# Patient Record
Sex: MALE | Race: WHITE | Employment: FULL TIME | URBAN - METROPOLITAN AREA
[De-identification: names, ages, dates, MRNs, and addresses within clinical notes are randomized per-mention and may not be internally consistent; named-entity substitution may affect disease eponyms.]

---

## 2018-03-21 ENCOUNTER — APPOINTMENT (OUTPATIENT)
Dept: OCCUPATIONAL MEDICINE | Age: 68
End: 2018-03-21
Attending: FAMILY MEDICINE

## 2018-03-27 ENCOUNTER — APPOINTMENT (OUTPATIENT)
Dept: OCCUPATIONAL MEDICINE | Age: 68
End: 2018-03-27
Attending: FAMILY MEDICINE

## 2018-04-03 ENCOUNTER — APPOINTMENT (OUTPATIENT)
Dept: OCCUPATIONAL MEDICINE | Age: 68
End: 2018-04-03
Attending: FAMILY MEDICINE

## 2022-01-31 ENCOUNTER — APPOINTMENT (OUTPATIENT)
Dept: FAMILY MEDICINE | Facility: CLINIC | Age: 72
End: 2022-01-31
Payer: COMMERCIAL

## 2022-01-31 DIAGNOSIS — Z20.822 CONTACT WITH AND (SUSPECTED) EXPOSURE TO COVID-19: ICD-10-CM

## 2022-01-31 LAB — SARS-COV-2 RNA RESP QL NAA+PROBE: NEGATIVE

## 2022-01-31 PROCEDURE — U0005 INFEC AGEN DETEC AMPLI PROBE: HCPCS | Performed by: FAMILY MEDICINE

## 2022-01-31 PROCEDURE — 87635 SARS-COV-2 COVID-19 AMP PRB: CPT | Performed by: FAMILY MEDICINE

## 2022-01-31 PROCEDURE — 99211 OFF/OP EST MAY X REQ PHY/QHP: CPT | Mod: CS,LAB | Performed by: FAMILY MEDICINE

## 2022-02-02 ENCOUNTER — TELEPHONE (OUTPATIENT)
Dept: FAMILY MEDICINE | Facility: CLINIC | Age: 72
End: 2022-02-02
Payer: COMMERCIAL

## 2022-02-02 NOTE — TELEPHONE ENCOUNTER
Your COVID-19 test has returned NEGATIVE.     Per the CDC guidelines, if you have had direct exposure (within 6 feet of someone for a cumulative total of 15 minutes or more over a 24-hour period) with a COVID positive patient in the last 14 days, it is recommended to Quarantine as follows:  If you/exposed person(s):  • Have been boosted OR  • Completed the primary series of Pfizer or Moderna vaccine within the last 6 months OR  • Completed the primary series of J&J vaccine within the last 2 months  Then:   • Wear a mask around others for 10 days.  • Test on day 5, if possible.  • If you develop symptoms get a test and stay home.    If you/exposed person(s):  • Completed the primary series of Pfizer or Moderna vaccine over 6 months ago and are not boosted OR  • Completed the primary series of J&J over 2 months ago and are not boosted OR  • Are unvaccinated  Then:   • Stay home for 5 days. After that continue to wear a mask around others for 5 additional days.  • If you can't quarantine you must wear a mask for 10 days.  • Test on day 5 if possible.  • If you develop symptoms get a test and stay home  Any close contacts in quarantine that becomes symptomatic can schedule COVID19 testing right away via self-scheduling options our website at [employer], on your [employer] patient portal or call [phone].  For further guidance on isolation, quarantine, or questions on COVID-19, please visit the [facility] website a Your Health  COVID-19  CDC    Continue to follow CDC guidelines to reduce risk of transmission and exposure to COVID-19 which include:  -Good social distancing practices  -Wear a cloth facial covering when in public  -Frequent hand washing with soap and water or an alcohol based hand  that contains at least 60% alcohol  -Regularly clean high touch surfaces  -Stay home if you are not feeling well    You may be a candidate for a product, REGEN-COV, that has been approved by the FDA for Emergency Use  Authorization for post-exposure prophylaxis (prevention) of COVID-19. This medication is for patients age 12 and older who are at high risk for progression to severe COVID-19 disease who are not vaccinated. For further information go to:   [email or web address]/[email or web address]-covid19-eua-fact-sheet-for-hcp.[email or web address]     If you are interested in receiving this treatment, please contact your Primary Care Provider or call [employer] Triage at [phone].    If you are interested in receiving a COVID-19 vaccination, please go to [employer], log onto your [employer] account or call [phone] to schedule.

## 2023-12-20 ENCOUNTER — OFFICE VISIT (OUTPATIENT)
Dept: URGENT CARE | Facility: CLINIC | Age: 73
End: 2023-12-20
Payer: MEDICARE

## 2023-12-20 VITALS
SYSTOLIC BLOOD PRESSURE: 152 MMHG | DIASTOLIC BLOOD PRESSURE: 84 MMHG | HEART RATE: 70 BPM | WEIGHT: 264 LBS | OXYGEN SATURATION: 95 % | TEMPERATURE: 97.5 F | RESPIRATION RATE: 18 BRPM

## 2023-12-20 DIAGNOSIS — R10.32 LEFT LOWER QUADRANT ABDOMINAL PAIN: Primary | ICD-10-CM

## 2023-12-20 PROBLEM — Z86.0100 HISTORY OF COLON POLYPS: Status: ACTIVE | Noted: 2023-12-20

## 2023-12-20 PROBLEM — R00.2 PALPITATION: Status: ACTIVE | Noted: 2023-12-20

## 2023-12-20 PROBLEM — E78.5 HYPERLIPIDEMIA: Status: ACTIVE | Noted: 2021-02-20

## 2023-12-20 PROBLEM — N18.2 STAGE 2 CHRONIC KIDNEY DISEASE: Status: ACTIVE | Noted: 2021-02-18

## 2023-12-20 PROBLEM — K21.9 GASTROESOPHAGEAL REFLUX DISEASE: Status: ACTIVE | Noted: 2021-02-19

## 2023-12-20 PROBLEM — K63.5 COLONIC POLYP: Status: ACTIVE | Noted: 2023-12-20

## 2023-12-20 PROBLEM — N18.9 CHRONIC KIDNEY DISEASE: Status: ACTIVE | Noted: 2021-02-17

## 2023-12-20 PROBLEM — E55.9 VITAMIN D DEFICIENCY: Status: ACTIVE | Noted: 2021-03-26

## 2023-12-20 PROBLEM — K21.9 GERD (GASTROESOPHAGEAL REFLUX DISEASE): Status: ACTIVE | Noted: 2021-02-20

## 2023-12-20 PROBLEM — K22.70 BARRETT ESOPHAGUS: Status: ACTIVE | Noted: 2023-12-20

## 2023-12-20 PROBLEM — R10.9 RIGHT FLANK PAIN: Status: ACTIVE | Noted: 2023-12-20

## 2023-12-20 PROBLEM — E66.9 OBESITY: Status: ACTIVE | Noted: 2021-02-20

## 2023-12-20 PROBLEM — E87.5 HYPERKALEMIA: Status: ACTIVE | Noted: 2021-02-20

## 2023-12-20 PROBLEM — I34.1 MITRAL VALVE PROLAPSE: Status: ACTIVE | Noted: 2021-02-19

## 2023-12-20 PROBLEM — M54.2 NECK PAIN: Status: ACTIVE | Noted: 2023-12-20

## 2023-12-20 PROBLEM — I34.1 MVP (MITRAL VALVE PROLAPSE): Status: ACTIVE | Noted: 2021-02-20

## 2023-12-20 PROBLEM — R20.2 NUMBNESS AND TINGLING IN RIGHT HAND: Status: ACTIVE | Noted: 2018-02-07

## 2023-12-20 PROBLEM — I12.9 HYPERTENSIVE RENAL DISEASE: Status: ACTIVE | Noted: 2021-02-20

## 2023-12-20 PROBLEM — Z86.69 H/O CARPAL TUNNEL SYNDROME: Status: ACTIVE | Noted: 2023-12-20

## 2023-12-20 PROBLEM — K59.00 CONSTIPATION: Status: ACTIVE | Noted: 2023-12-20

## 2023-12-20 PROBLEM — I10 HYPERTENSION: Status: ACTIVE | Noted: 2021-02-20

## 2023-12-20 PROBLEM — R20.0 NUMBNESS AND TINGLING IN RIGHT HAND: Status: ACTIVE | Noted: 2018-02-07

## 2023-12-20 PROBLEM — R10.9 ABDOMINAL PAIN: Status: ACTIVE | Noted: 2023-12-20

## 2023-12-20 PROBLEM — I10 ESSENTIAL HYPERTENSION: Status: ACTIVE | Noted: 2021-02-19

## 2023-12-20 LAB
ALBUMIN SERPL-MCNC: 4.1 G/DL (ref 3.5–5.3)
ALP SERPL-CCNC: 60 U/L (ref 45–115)
ALT SERPL-CCNC: 14 U/L (ref 7–52)
ANION GAP SERPL CALC-SCNC: 7 MMOL/L (ref 3–11)
AST SERPL-CCNC: 14 U/L
BACTERIA #/AREA URNS HPF: NORMAL /HPF
BASOPHILS # BLD AUTO: 0.1 10*3/UL
BASOPHILS NFR BLD AUTO: 0.4 % (ref 0–2)
BILIRUB SERPL-MCNC: 0.98 MG/DL (ref 0.2–1.4)
BILIRUB UR QL: NEGATIVE
BUN SERPL-MCNC: 17 MG/DL (ref 7–25)
CALCIUM ALBUM COR SERPL-MCNC: 9.4 MG/DL (ref 8.6–10.3)
CALCIUM SERPL-MCNC: 9.5 MG/DL (ref 8.6–10.3)
CHLORIDE SERPL-SCNC: 100 MMOL/L (ref 98–107)
CLARITY UR: CLEAR
CO2 SERPL-SCNC: 30 MMOL/L (ref 21–32)
COLOR UR: YELLOW
CREAT SERPL-MCNC: 0.93 MG/DL (ref 0.7–1.3)
CRP SERPL-MCNC: 34.3 MG/L
EGFRCR SERPLBLD CKD-EPI 2021: 88 ML/MIN/1.73M*2
EOSINOPHIL # BLD AUTO: 0.2 10*3/UL
EOSINOPHIL NFR BLD AUTO: 1.3 % (ref 0–3)
ERYTHROCYTE [DISTWIDTH] IN BLOOD BY AUTOMATED COUNT: 13.1 % (ref 11.5–15)
GLUCOSE SERPL-MCNC: 106 MG/DL (ref 70–105)
GLUCOSE UR QL: NEGATIVE MG/DL
HCT VFR BLD AUTO: 48 % (ref 38–50)
HGB BLD-MCNC: 16.2 G/DL (ref 13.2–17.2)
HGB UR QL: NEGATIVE
KETONES UR-MCNC: NEGATIVE MG/DL
LEUKOCYTE ESTERASE UR QL STRIP: NEGATIVE
LIPASE SERPL-CCNC: 21 U/L (ref 11–82)
LYMPHOCYTES # BLD AUTO: 1.8 10*3/UL
LYMPHOCYTES NFR BLD AUTO: 14.3 % (ref 15–47)
MCH RBC QN AUTO: 30.1 PG (ref 29–34)
MCHC RBC AUTO-ENTMCNC: 33.7 G/DL (ref 32–36)
MCV RBC AUTO: 89.5 FL (ref 82–97)
MONOCYTES # BLD AUTO: 1.2 10*3/UL
MONOCYTES NFR BLD AUTO: 9.9 % (ref 5–13)
NEUTROPHILS # BLD AUTO: 9.1 10*3/UL
NEUTROPHILS NFR BLD AUTO: 74.1 % (ref 46–70)
NITRITE UR QL: NEGATIVE
PH UR: 7 PH
PLATELET # BLD AUTO: 254 10*3/UL (ref 130–350)
PMV BLD AUTO: 7.7 FL (ref 6.9–10.8)
POTASSIUM SERPL-SCNC: 4.6 MMOL/L (ref 3.5–5.1)
PROT SERPL-MCNC: 7.2 G/DL (ref 6–8.3)
PROT UR STRIP-MCNC: 30 MG/DL
RBC # BLD AUTO: 5.36 10*6/ΜL (ref 4.1–5.8)
RBC #/AREA URNS HPF: NORMAL /HPF
SODIUM SERPL-SCNC: 137 MMOL/L (ref 135–145)
SP GR UR: 1.02 (ref 1–1.03)
SQUAMOUS #/AREA URNS HPF: NORMAL /HPF
UROBILINOGEN UR-MCNC: 0.2 MG/DL
WBC # BLD AUTO: 12.3 10*3/UL (ref 3.7–9.6)
WBC #/AREA URNS HPF: NORMAL /HPF

## 2023-12-20 PROCEDURE — 36415 COLL VENOUS BLD VENIPUNCTURE: CPT | Performed by: NURSE PRACTITIONER

## 2023-12-20 PROCEDURE — 80053 COMPREHEN METABOLIC PANEL: CPT | Performed by: NURSE PRACTITIONER

## 2023-12-20 PROCEDURE — G0463 HOSPITAL OUTPT CLINIC VISIT: HCPCS | Performed by: NURSE PRACTITIONER

## 2023-12-20 PROCEDURE — 85025 COMPLETE CBC W/AUTO DIFF WBC: CPT | Performed by: NURSE PRACTITIONER

## 2023-12-20 PROCEDURE — 86140 C-REACTIVE PROTEIN: CPT | Performed by: NURSE PRACTITIONER

## 2023-12-20 PROCEDURE — 81001 URINALYSIS AUTO W/SCOPE: CPT | Performed by: NURSE PRACTITIONER

## 2023-12-20 PROCEDURE — 99214 OFFICE O/P EST MOD 30 MIN: CPT | Performed by: NURSE PRACTITIONER

## 2023-12-20 PROCEDURE — 83690 ASSAY OF LIPASE: CPT | Performed by: NURSE PRACTITIONER

## 2023-12-20 RX ORDER — NIACIN (INOSITOL NIACINATE) 400(500MG)
200 CAPSULE ORAL DAILY
COMMUNITY

## 2023-12-20 RX ORDER — FINASTERIDE 1 MG/1
TABLET, FILM COATED ORAL
COMMUNITY
Start: 2023-11-06

## 2023-12-20 RX ORDER — HYDROGEN PEROXIDE 3 %
20 SOLUTION, NON-ORAL MISCELLANEOUS DAILY
COMMUNITY

## 2023-12-20 RX ORDER — ATORVASTATIN CALCIUM 20 MG/1
TABLET, FILM COATED ORAL
COMMUNITY
End: 2023-12-20 | Stop reason: ALTCHOICE

## 2023-12-20 RX ORDER — METOPROLOL SUCCINATE 50 MG/1
50 TABLET, EXTENDED RELEASE ORAL 2 TIMES DAILY
COMMUNITY

## 2023-12-20 RX ORDER — ASCORBIC ACID 500 MG
TABLET ORAL DAILY
COMMUNITY

## 2023-12-20 RX ORDER — ATORVASTATIN CALCIUM 40 MG/1
40 TABLET, FILM COATED ORAL DAILY
COMMUNITY

## 2023-12-20 ASSESSMENT — ENCOUNTER SYMPTOMS
BACK PAIN: 1
NERVOUS/ANXIOUS: 0
VOMITING: 0
ABDOMINAL PAIN: 1
SINUS PAIN: 0
HEADACHES: 0
SORE THROAT: 0
CHILLS: 0
AGITATION: 0
ARTHRALGIAS: 0
TROUBLE SWALLOWING: 0
CONSTIPATION: 0
NAUSEA: 0
DIARRHEA: 0
SHORTNESS OF BREATH: 0
DIZZINESS: 0
WHEEZING: 0
SINUS PRESSURE: 0
DIFFICULTY URINATING: 0
FREQUENCY: 0
MYALGIAS: 0
CHEST TIGHTNESS: 0
PALPITATIONS: 0
COUGH: 0
EYE PAIN: 0
RHINORRHEA: 0
FEVER: 0
WEAKNESS: 0
LIGHT-HEADEDNESS: 0

## 2023-12-20 ASSESSMENT — PAIN SCALES - GENERAL: PAINLEVEL: 5

## 2023-12-20 NOTE — PROGRESS NOTES
Subjective      Jace Smallwood is a 73 y.o. male who presents for abdominal pain.    Patient reports that he developed left lower quadrant abdominal pain yesterday afternoon.  He reports the pain is sharp and intermittent.  His last bowel movement was yesterday afternoon.  He denies any urinary symptoms.  Does report that he has been dealing with issues with low back pain but this is unrelated and his back pain feels the same as previous.        The following have been reviewed and updated as appropriate in this visit:   Tobacco  Allergies  Meds  Problems  Med Hx  Surg Hx  Fam Hx         Allergies   Allergen Reactions    Cefaclor Rash     Current Outpatient Medications   Medication Sig Dispense Refill    ascorbic acid, vitamin C, (VITAMIN C) 500 mg tablet daily      metoprolol succinate XL (TOPROL-XL) 50 mg 24 hr tablet Take 1 tablet (50 mg total) by mouth 2 (two) times a day      esomeprazole (NexIUM) 20 mg capsule Take 1 capsule (20 mg total) by mouth daily      finasteride (PROPECIA) 1 mg tablet Take by mouth      coenzyme Q10-vitamin E 100-5 mg-unit capsule Take 200 mg by mouth daily      atorvastatin (LIPITOR) 40 mg tablet Take 1 tablet (40 mg total) by mouth daily       No current facility-administered medications for this visit.     History reviewed. No pertinent past medical history.  History reviewed. No pertinent surgical history.  History reviewed. No pertinent family history.  Social History     Socioeconomic History    Marital status: Unknown   Tobacco Use    Smoking status: Never    Smokeless tobacco: Never   Substance and Sexual Activity    Alcohol use: Yes     Alcohol/week: 12.0 standard drinks of alcohol     Types: 12 Cans of beer per week     Social Determinants of Health     Tobacco Use: Low Risk  (12/20/2023)    Patient History     Smoking Tobacco Use: Never     Smokeless Tobacco Use: Never       Review of Systems   Constitutional:  Negative for chills and fever.   HENT:  Negative for  congestion, ear pain, rhinorrhea, sinus pressure, sinus pain, sore throat and trouble swallowing.    Eyes:  Negative for pain and visual disturbance.   Respiratory:  Negative for cough, chest tightness, shortness of breath and wheezing.    Cardiovascular:  Negative for chest pain and palpitations.   Gastrointestinal:  Positive for abdominal pain. Negative for constipation, diarrhea, nausea and vomiting.   Endocrine: Negative for cold intolerance and heat intolerance.   Genitourinary:  Negative for difficulty urinating, frequency and urgency.   Musculoskeletal:  Positive for back pain (Chronic). Negative for arthralgias and myalgias.   Skin:  Negative for rash.   Neurological:  Negative for dizziness, weakness, light-headedness and headaches.   Psychiatric/Behavioral:  Negative for agitation. The patient is not nervous/anxious.        Objective   /84 (BP Location: Right arm, Patient Position: Sitting, Cuff Size: Long Adult)   Pulse 70   Temp 36.4 °C (97.5 °F) (Temporal)   Resp 18   Wt 119.7 kg (264 lb)   SpO2 95%   BP Readings from Last 3 Encounters:   12/20/23 152/84      Wt Readings from Last 3 Encounters:   12/20/23 119.7 kg (264 lb)      Pulse Readings from Last 3 Encounters:   12/20/23 70      Resp Readings from Last 3 Encounters:   12/20/23 18       Physical Exam  Vitals and nursing note reviewed.   Constitutional:       Appearance: Normal appearance. He is normal weight.   HENT:      Head: Normocephalic and atraumatic.      Right Ear: Tympanic membrane normal.      Left Ear: Tympanic membrane normal.      Nose: Nose normal.      Mouth/Throat:      Mouth: Mucous membranes are moist.   Eyes:      Extraocular Movements: Extraocular movements intact.      Pupils: Pupils are equal, round, and reactive to light.   Cardiovascular:      Rate and Rhythm: Normal rate and regular rhythm.      Pulses: Normal pulses.      Heart sounds: Normal heart sounds.   Pulmonary:      Effort: Pulmonary effort is normal.       Breath sounds: Normal breath sounds.   Abdominal:      General: Abdomen is flat. Bowel sounds are normal.      Palpations: Abdomen is soft.      Tenderness: There is abdominal tenderness in the left lower quadrant. There is guarding.       Musculoskeletal:         General: Normal range of motion.      Cervical back: Normal range of motion and neck supple.   Lymphadenopathy:      Cervical: No cervical adenopathy.   Skin:     General: Skin is warm and dry.      Capillary Refill: Capillary refill takes less than 2 seconds.   Neurological:      General: No focal deficit present.      Mental Status: He is alert and oriented to person, place, and time.   Psychiatric:         Mood and Affect: Mood normal.         Behavior: Behavior normal.         Recent Results (from the past 24 hour(s))   CBC w/auto differential Blood, Venous    Collection Time: 12/20/23  7:52 AM   Result Value Ref Range    WBC 12.3 (H) 3.7 - 9.6 10*3/uL    RBC 5.36 4.10 - 5.80 10*6/µL    Hemoglobin 16.2 13.2 - 17.2 g/dL    Hematocrit 48.0 38.0 - 50.0 %    MCV 89.5 82.0 - 97.0 fL    MCH 30.1 29.0 - 34.0 pg    MCHC 33.7 32.0 - 36.0 g/dL    RDW 13.1 11.5 - 15.0 %    Platelets 254 130 - 350 10*3/uL    MPV 7.7 6.9 - 10.8 fL    Neutrophils% 74.1 (H) 46.0 - 70.0 %    Lymphocytes% 14.3 (L) 15.0 - 47.0 %    Monocytes% 9.9 5.0 - 13.0 %    Eosinophils% 1.3 0.0 - 3.0 %    Basophils% 0.4 0.0 - 2.0 %    ANC (auto diff) 9.10 10*3/UL    Lymphocytes Absolute 1.80 10*3/uL    Monocytes Absolute 1.20 10*3/uL    Eosinophils Absolute 0.20 10*3/uL    Basophils Absolute 0.10 10*3/uL   Comprehensive metabolic panel Blood, Venous    Collection Time: 12/20/23  7:52 AM   Result Value Ref Range    Sodium 137 135 - 145 mmol/L    Potassium 4.6 3.5 - 5.1 MMOL/L    Chloride 100 98 - 107 mmol/L    CO2 30 21 - 32 mmol/L    Anion Gap 7 3 - 11 mmol/L    BUN 17 7 - 25 mg/dL    Creatinine 0.93 0.70 - 1.30 mg/dL    Glucose 106 (H) 70 - 105 mg/dL    Calcium 9.5 8.6 - 10.3 mg/dL    AST 14 <40  U/L    ALT (SGPT) 14 7 - 52 U/L    Alkaline Phosphatase 60 45 - 115 U/L    Total Protein 7.2 6.0 - 8.3 g/dL    Albumin 4.1 3.5 - 5.3 g/dL    Total Bilirubin 0.98 0.20 - 1.40 mg/dL    Corrected Calcium 9.4 8.6 - 10.3 mg/dL    eGFR 88 >60 mL/min/1.73m*2   Lipase Blood, Venous    Collection Time: 12/20/23  7:52 AM   Result Value Ref Range    Lipase 21 11 - 82 U/L   C-reactive protein (Inflammation) Blood, Venous    Collection Time: 12/20/23  7:52 AM   Result Value Ref Range    CRP 34.3 (H) <=10.0 MG/L   Urinalysis, Dip (part 1 of panel) Urine, Clean Catch    Collection Time: 12/20/23  7:53 AM    Specimen: Urine, Clean Catch   Result Value Ref Range    Color, Urine Yellow Yellow    Clarity, Urine Clear Clear    pH, Urine 7.0 5.0 - 8.0 PH    Specific Gravity, Urine 1.020 1.003 - 1.030    Protein, Urine 30 (A) Negative MG/DL    Glucose, Urine Negative Negative MG/DL    Ketones, Urine Negative Negative MG/DL    Blood, Urine Negative Negative    Nitrite, Urine Negative Negative    Bilirubin, Urine Negative Negative    Leukocytes, Urine Negative Negative    Urobilinogen, Urine 0.2 <2.0 mg/dL   Urinalysis, Micro (part 2 of panel) Urine, Clean Catch    Collection Time: 12/20/23  7:53 AM    Specimen: Urine, Clean Catch   Result Value Ref Range    RBC, Urine None seen None seen, 0-2, Negative /HPF    WBC, Urine None seen 0 - 4 /HPF    Squamous Epithelial, Urine 0-4 None Seen-9 /HPF    Bacteria, Urine None seen None seen, Few /HPF       Assessment/Plan   Diagnoses and all orders for this visit:    Left lower quadrant abdominal pain  -     CBC w/auto differential Blood, Venous  -     Comprehensive metabolic panel Blood, Venous  -     Lipase Blood, Venous  -     C-reactive protein (Inflammation) Blood, Venous  -     Urinalysis w/microscopic, reflex culture Urine, Clean Catch  -     CT abdomen pelvis with IV contrast    Blood count does show an elevated white blood cell count with mild neutrophil shift.  Urinalysis is normal.   Chemistry panel is stable.  CRP elevated at 34.3.  Plan will be to set the patient up for CT of the abdomen pelvis to rule out diverticulitis versus diverticulosis.  Patient does have a history of an abnormal CT scan in the past that did show some sort of abnormality of the kidney as well as possible diverticulosis.    Ultimately patient's insurance is unwilling to pay for a CT scan out of the urgent care due to his out of network status.  Patient is instructed to present to the emergency room of the symptoms worsen in any way.  He is encouraged to maintain a low residual diet today with clear liquids initially and advancing slowly to full liquids and a brat diet after that.  He is also encouraged to have a high-fiber diet to help with any diverticulosis.  Patient is instructed to immediately present to the emergency room with any worsening pain.    Patient Instructions   You are advised to stay well hydrated.  Start initially with clear liquids and then advance to full liquids.  Easy to digest diet is recommended:  Bananas, rice, applesauce, toast, broth, crackers.  Avoid greasy and spicy foods.  You can gradually increase diet as tolerated.  You are also encouraged to maintain a good fiber content in your diet.  If you develop any worsening pain or problems present to the emergency room immediately for further evaluation.    Yanet Queen, CNP

## 2023-12-20 NOTE — PATIENT INSTRUCTIONS
You are advised to stay well hydrated.  Start initially with clear liquids and then advance to full liquids.  Easy to digest diet is recommended:  Bananas, rice, applesauce, toast, broth, crackers.  Avoid greasy and spicy foods.  You can gradually increase diet as tolerated.  You are also encouraged to maintain a good fiber content in your diet.  If you develop any worsening pain or problems present to the emergency room immediately for further evaluation.